# Patient Record
Sex: MALE | Race: OTHER | HISPANIC OR LATINO | Employment: FULL TIME | URBAN - METROPOLITAN AREA
[De-identification: names, ages, dates, MRNs, and addresses within clinical notes are randomized per-mention and may not be internally consistent; named-entity substitution may affect disease eponyms.]

---

## 2020-11-28 ENCOUNTER — HOSPITAL ENCOUNTER (EMERGENCY)
Facility: HOSPITAL | Age: 30
Discharge: HOME | End: 2020-11-28
Attending: EMERGENCY MEDICINE | Admitting: EMERGENCY MEDICINE
Payer: MEDICARE

## 2020-11-28 ENCOUNTER — APPOINTMENT (EMERGENCY)
Dept: CT IMAGING | Facility: HOSPITAL | Age: 30
End: 2020-11-28
Payer: MEDICARE

## 2020-11-28 VITALS
OXYGEN SATURATION: 98 % | RESPIRATION RATE: 20 BRPM | DIASTOLIC BLOOD PRESSURE: 93 MMHG | SYSTOLIC BLOOD PRESSURE: 138 MMHG | TEMPERATURE: 97.4 F | HEART RATE: 88 BPM

## 2020-11-28 DIAGNOSIS — N20.0 KIDNEY STONE: Primary | ICD-10-CM

## 2020-11-28 PROCEDURE — 74176 CT ABD & PELVIS W/O CONTRAST: CPT

## 2020-11-28 PROCEDURE — 96372 THER/PROPH/DIAG INJ SC/IM: CPT

## 2020-11-28 PROCEDURE — 99284 EMERGENCY DEPT VISIT MOD MDM: CPT

## 2020-11-28 PROCEDURE — G1004 CDSM NDSC: HCPCS

## 2020-11-28 PROCEDURE — 99284 EMERGENCY DEPT VISIT MOD MDM: CPT | Performed by: EMERGENCY MEDICINE

## 2020-11-28 RX ORDER — TAMSULOSIN HYDROCHLORIDE 0.4 MG/1
0.4 CAPSULE ORAL
Qty: 10 CAPSULE | Refills: 0 | Status: SHIPPED | OUTPATIENT
Start: 2020-11-28 | End: 2021-02-22 | Stop reason: ALTCHOICE

## 2020-11-28 RX ORDER — KETOROLAC TROMETHAMINE 30 MG/ML
30 INJECTION, SOLUTION INTRAMUSCULAR; INTRAVENOUS ONCE
Status: COMPLETED | OUTPATIENT
Start: 2020-11-28 | End: 2020-11-28

## 2020-11-28 RX ORDER — OXYCODONE HYDROCHLORIDE AND ACETAMINOPHEN 5; 325 MG/1; MG/1
1 TABLET ORAL EVERY 4 HOURS PRN
Qty: 16 TABLET | Refills: 0 | Status: SHIPPED | OUTPATIENT
Start: 2020-11-28 | End: 2020-12-08

## 2020-11-28 RX ADMIN — KETOROLAC TROMETHAMINE 30 MG: 30 INJECTION, SOLUTION INTRAMUSCULAR; INTRAVENOUS at 16:14

## 2021-01-10 ENCOUNTER — HOSPITAL ENCOUNTER (EMERGENCY)
Facility: HOSPITAL | Age: 31
Discharge: HOME | End: 2021-01-10
Attending: EMERGENCY MEDICINE | Admitting: EMERGENCY MEDICINE
Payer: MEDICARE

## 2021-01-10 VITALS
SYSTOLIC BLOOD PRESSURE: 149 MMHG | OXYGEN SATURATION: 100 % | TEMPERATURE: 99.1 F | DIASTOLIC BLOOD PRESSURE: 103 MMHG | HEART RATE: 70 BPM | RESPIRATION RATE: 16 BRPM | WEIGHT: 315 LBS

## 2021-01-10 DIAGNOSIS — J02.9 SORE THROAT: Primary | ICD-10-CM

## 2021-01-10 LAB — S PYO DNA THROAT QL NAA+PROBE: NORMAL

## 2021-01-10 PROCEDURE — 99283 EMERGENCY DEPT VISIT LOW MDM: CPT

## 2021-01-10 PROCEDURE — 99284 EMERGENCY DEPT VISIT MOD MDM: CPT | Performed by: PHYSICIAN ASSISTANT

## 2021-01-10 PROCEDURE — 87651 STREP A DNA AMP PROBE: CPT | Performed by: PHYSICIAN ASSISTANT

## 2021-01-10 PROCEDURE — 87637 SARSCOV2&INF A&B&RSV AMP PRB: CPT | Performed by: PHYSICIAN ASSISTANT

## 2021-01-10 RX ORDER — BENZONATATE 100 MG/1
100 CAPSULE ORAL 3 TIMES DAILY PRN
Qty: 21 CAPSULE | Refills: 0 | Status: SHIPPED | OUTPATIENT
Start: 2021-01-10 | End: 2021-02-22 | Stop reason: ALTCHOICE

## 2021-01-10 NOTE — Clinical Note
Anjana Monroe was seen and treated in our emergency department on 1/10/2021  Other - See Comments        Diagnosis:     Binh    He may return on this date:     Do not return to work until negative test results returned      If you have any questions or concerns, please don't hesitate to call        Anita Yoder PA-C    ______________________________           _______________          _______________  Hospital Representative                              Date                                Time

## 2021-01-10 NOTE — DISCHARGE INSTRUCTIONS
Rule Out COVID/Strep  -Quarantine self until test results return  -Tessalon Pearls for cough  -Tylenol/Ibuprofen alternating for pain/discomfort  -ED will call with test results  -May print test results through [facility] My Chart

## 2021-01-10 NOTE — ED PROVIDER NOTES
History  Chief Complaint   Patient presents with    Sore Throat     Sent home from work d/t fever, sore throat, headache  States in order to go back to work must have a covid test       HPI  28-year-old male with past medical history of hypertension and kidney stones presents to the ED after direct exposure to COVID positive neighbor  Patient states that he attempted to go to work at [employer] on Friday evening, and at the check in gate his temperature was 102°F   He also admitted to associated sore throat and headache x2 days  He states that he had not spiked a fever since Friday evening  He denies any nausea, vomiting, chills, shortness of breath, chest pain, abdominal pain, change in bowel or bladder habits  He admits to positive sick contacts with COVID  He has no recent travel history  Prior to Admission Medications   Prescriptions Last Dose Informant Patient Reported? Taking?   tamsulosin (FLOMAX) 0 4 mg   No Yes   Sig: Take 1 capsule (0 4 mg total) by mouth daily with dinner      Facility-Administered Medications: None       Past Medical History:   Diagnosis Date    Hypertension     Kidney stone        History reviewed  No pertinent surgical history  History reviewed  No pertinent family history  I have reviewed and agree with the history as documented  E-Cigarette/Vaping     E-Cigarette/Vaping Substances     Social History     Tobacco Use    Smoking status: Current Every Day Smoker    Smokeless tobacco: Never Used    Tobacco comment: Hookah daily   Substance Use Topics    Alcohol use: Yes     Comment: socially    Drug use: Never       Review of Systems   Constitutional: Positive for fever  Negative for activity change and chills  HENT: Positive for sore throat  Negative for congestion  Respiratory: Negative for cough, shortness of breath and wheezing  Cardiovascular: Negative for chest pain and palpitations     Gastrointestinal: Negative for abdominal pain, diarrhea, nausea and vomiting  Genitourinary: Negative for difficulty urinating and flank pain  Musculoskeletal: Negative for back pain and neck pain  Skin: Negative for color change and rash  Neurological: Positive for headaches  Negative for weakness and light-headedness  Psychiatric/Behavioral: Negative for agitation and behavioral problems  Physical Exam  Physical Exam  Vitals signs and nursing note reviewed  Constitutional:       Appearance: He is well-developed  HENT:      Head: Normocephalic and atraumatic  Right Ear: External ear normal       Left Ear: External ear normal       Mouth/Throat:      Lips: Pink  Mouth: Mucous membranes are moist       Palate: No mass and lesions  Pharynx: Oropharynx is clear  Posterior oropharyngeal erythema present  No oropharyngeal exudate or uvula swelling  Tonsils: No tonsillar exudate  1+ on the right  1+ on the left  Eyes:      Conjunctiva/sclera: Conjunctivae normal    Neck:      Musculoskeletal: Normal range of motion  Cardiovascular:      Rate and Rhythm: Normal rate and regular rhythm  Heart sounds: Normal heart sounds  Pulmonary:      Effort: Pulmonary effort is normal       Breath sounds: Normal breath sounds  Abdominal:      Palpations: Abdomen is soft  Musculoskeletal: Normal range of motion  Skin:     General: Skin is warm  Findings: No rash  Neurological:      Mental Status: He is alert and oriented to person, place, and time     Psychiatric:         Behavior: Behavior normal          Vital Signs  ED Triage Vitals [01/10/21 1102]   Temperature Pulse Respirations Blood Pressure SpO2   99 1 °F (37 3 °C) 70 16 (!) 149/103 100 %      Temp Source Heart Rate Source Patient Position - Orthostatic VS BP Location FiO2 (%)   Temporal Monitor Sitting Right arm --      Pain Score       6           Vitals:    01/10/21 1102   BP: (!) 149/103   Pulse: 70   Patient Position - Orthostatic VS: Sitting         Visual Acuity      ED Medications  Medications - No data to display    Diagnostic Studies  Results Reviewed     Procedure Component Value Units Date/Time    Strep A PCR [[id number]] Collected: 01/10/21 1125    Lab Status: In process Specimen: Throat Updated: 01/10/21 1128    Novel Coronavirus 2019(COVID-19), Influenza A/B, RSV [facility] [[id number]] Collected: 01/10/21 1125    Lab Status: In process Specimen: Nasopharyngeal Swab Updated: 01/10/21 1128                 No orders to display              Procedures  Procedures         ED Course                                           MDM  Number of Diagnoses or Management Options  Sore throat: minor  Diagnosis management comments: 72-year-old male with past medical history of hypertension and kidney stones presents to the ED after direct exposure to COVID positive neighbor  Patient states that he attempted to go to work at [employer] on Friday evening, and at the check in gate his temperature was 102°F      Rule out COVID/strep/influenza  -strep test  -COVID test  -discharge with quarantine instructions until test results returned       Amount and/or Complexity of Data Reviewed  Clinical lab tests: ordered    Patient Progress  Patient progress: stable      Disposition  Final diagnoses:   Sore throat     Time reflects when diagnosis was documented in both MDM as applicable and the Disposition within this note     Time User Action Codes Description Comment    1/10/2021 11:27 AM Yuri Mcclelland Add [J02 9] Sore throat       ED Disposition     ED Disposition Condition Date/Time Comment    Discharge Stable Sun Daniel 10, 2021 11:27 AM [address] discharge to home/self care              Follow-up Information    None         Patient's Medications   Discharge Prescriptions    BENZONATATE (TESSALON PERLES) 100 MG CAPSULE    Take 1 capsule (100 mg total) by mouth 3 (three) times a day as needed for cough       Start Date: 1/10/2021 End Date: --       Order Dose: 100 mg Quantity: 21 capsule    Refills: 0     No discharge procedures on file      PDMP Review     None          ED Provider  Electronically Signed by           Maureen Rivera PA-C  01/10/21 1224 [place], [place]  01/10/21 5752

## 2021-01-11 LAB
FLUAV RNA NPH QL NAA+PROBE: NOT DETECTED
FLUBV RNA NPH QL NAA+PROBE: NOT DETECTED
RSV RNA NPH QL NAA+PROBE: NOT DETECTED
SARS-COV-2 RNA NPH QL NAA+PROBE: NOT DETECTED

## 2021-01-11 NOTE — RESULT ENCOUNTER NOTE
Discussed negative covid result with patient  Patient verbalized understanding and all questions answered

## 2021-02-22 ENCOUNTER — NURSE TRIAGE (OUTPATIENT)
Dept: OTHER | Facility: OTHER | Age: 31
End: 2021-02-22

## 2021-02-22 ENCOUNTER — HOSPITAL ENCOUNTER (EMERGENCY)
Facility: HOSPITAL | Age: 31
Discharge: HOME | End: 2021-02-22
Attending: EMERGENCY MEDICINE
Payer: MEDICARE

## 2021-02-22 VITALS
TEMPERATURE: 98.5 F | SYSTOLIC BLOOD PRESSURE: 166 MMHG | HEART RATE: 63 BPM | WEIGHT: 315 LBS | DIASTOLIC BLOOD PRESSURE: 87 MMHG | OXYGEN SATURATION: 100 % | RESPIRATION RATE: 18 BRPM

## 2021-02-22 DIAGNOSIS — Z20.822 ENCOUNTER FOR LABORATORY TESTING FOR COVID-19 VIRUS: ICD-10-CM

## 2021-02-22 DIAGNOSIS — B34.9 VIRAL SYNDROME: Primary | ICD-10-CM

## 2021-02-22 LAB — SARS-COV-2 RNA RESP QL NAA+PROBE: NEGATIVE

## 2021-02-22 PROCEDURE — 99282 EMERGENCY DEPT VISIT SF MDM: CPT | Performed by: PHYSICIAN ASSISTANT

## 2021-02-22 PROCEDURE — 99283 EMERGENCY DEPT VISIT LOW MDM: CPT

## 2021-02-22 PROCEDURE — 87635 SARS-COV-2 COVID-19 AMP PRB: CPT | Performed by: PHYSICIAN ASSISTANT

## 2021-02-22 NOTE — TELEPHONE ENCOUNTER
Patient stated that he went to the ER for testing and was told to call out hotline  Will go back for evaluation and is requesting to be tested

## 2021-02-22 NOTE — TELEPHONE ENCOUNTER
1  Were you within 6 feet or less, for up to 15 minutes or more with a person that has a confirmed COVID-19 test? Yes, my niece  2  What was the date of your exposure? Symptoms started on 02/20/2021  3  Are you experiencing any symptoms attributed to the virus?  (Assess for SOB, cough, fever, difficulty breathing) Chest pain is intermittent, 4 out of 10 now but can can go up to 9 or 10, HA, 100 5 / thermal scanner, very slight cough, mild sore throat, runny nose, nasal congestion, diarrhea, nausea,  No sense of taste, back pain and fatigue  4  HIGH RISK: Do you have any history heart or lung c onditions, weakened immune system, diabetes, Asthma, CHF, HIV, COPD, Chemo, renal failure, sickle cell, etc? Denies  5  PREGNANCY: Are you pregnant or did you recently give birth?  N/A

## 2021-02-22 NOTE — Clinical Note
Neal Najera was seen and treated in our emergency department on 2/22/2021  Diagnosis: Viral syndrome, Pending COVID-19 test    Binh    He may return on this date:     Patient must quarantine until informed of COVID-19 test results  If negative, patient must be without symptoms for 3 days before returning to work  If you have any questions or concerns, please don't hesitate to call        Dea Lopez PA-C    ______________________________           _______________          _______________  Hospital Representative                              Date                                Time

## 2021-02-22 NOTE — ED PROVIDER NOTES
HPI: Patient is a 31 y o  male who presents with 2 days of fever, headache, fatigue, myalgias and diarrhea which the patient describes at mild, diarrhea is non-bloody  Patient took tylenol just prior to arrival for headache with resolution of headache  Patient denies any associated vision changes, numbness, tingling, focal weakness, neck pain or stiffness, dizziness, lightheadedness, abdominal pain  The patient has had contact with people with similar symptoms  The patient taken OTC medication with relief of symptoms  Contrary to triage, patient denied chest pain, SOB  No Known Allergies    Past Medical History:   Diagnosis Date    Hypertension     Kidney stone       History reviewed  No pertinent surgical history  Social History     Tobacco Use    Smoking status: Current Every Day Smoker    Smokeless tobacco: Never Used    Tobacco comment: [employer] daily   Substance Use Topics    Alcohol use: Yes     Comment: socially    Drug use: Never       Nursing notes reviewed  Physical Exam:  ED Triage Vitals   Temperature Pulse Respirations Blood Pressure SpO2   02/22/21 1151 02/22/21 1151 02/22/21 1151 02/22/21 1152 02/22/21 1151   98 5 °F (36 9 °C) 63 18 166/87 100 %      Temp Source Heart Rate Source Patient Position - Orthostatic VS BP Location FiO2 (%)   02/22/21 1151 -- 02/22/21 1151 02/22/21 1151 --   Oral  Sitting Right arm       Pain Score       --                  ROS: Positive for fever, mild, intermittent headache, body aches, fatigue  Tthe remainder of a 10 organ system ROS was otherwise unremarkable  General: awake, alert, no acute distress    Patient appears well, no acute distress, non-toxic appearing    Head: normocephalic, atraumatic    Eyes: no scleral icterus  Ears: external ears normal, hearing grossly intact  Nose: external exam grossly normal, positive nasal discharge  Neck: symmetric, No JVD noted, trachea midline  Pulmonary: no respiratory distress, no tachypnea noted  Cardiovascular: appears well perfused  Abdomen: no distention noted  Musculoskeletal: no deformities noted, tone normal  Neuro: grossly non-focal  Psych: mood and affect appropriate    The patient is stable and has a history and physical exam consistent with a viral illness  COVID19 testing has been performed  I considered the patient's other medical conditions as applicable/noted above in my medical decision making  The patient is stable upon discharge  The plan is for supportive care at home  The patient (and any family present) verbalized understanding of the discharge instructions and warnings that would necessitate return to the Emergency Department  Extensive discussion with patient regarding COVID19 testing, precautions, treatment at home, education including home self-quarantine instructions  Provided education should results be positive or negative  Reviewed red flags symptoms and strict return to ED instructions  Patient notes understanding and  agrees to plan  All questions were answered prior to discharge  Medications - No data to display  Final diagnoses:   Viral syndrome   Encounter for laboratory testing for COVID-19 virus     Time reflects when diagnosis was documented in both MDM as applicable and the Disposition within this note     Time User Action Codes Description Comment    2/22/2021 12:24 PM Ben Valle Add [B34 9] Viral syndrome     2/22/2021 12:24 PM Andree Berry Add [Z20 822] Encounter for laboratory testing for COVID-19 virus       ED Disposition     ED Disposition Condition Date/Time Comment    Discharge Stable Mon Feb 22, 2021 12:24 PM [address] discharge to home/self care              Follow-up Information     Follow up With Specialties Details Why Contact Info Additional [address] [facility] Emergency Medicine  If symptoms worsen [place] [phone]  [facility], [place], [facility], [address]  , [place], [place], [address]        There are no discharge medications for this patient  No discharge procedures on file      Electronically Signed by     Deb Grajeda PA-C  02/22/21 1593

## 2021-02-22 NOTE — TELEPHONE ENCOUNTER
Regarding: COVID - Symptomatic - Chest Pain, Fever  ----- Message from Lona Levine sent at 2/22/2021 10:49 AM EST -----  "I have a fever (100 5), chest pain, headaches, and I had exposure to COVID   I need to get tested I was sent home from work "

## 2021-02-22 NOTE — TELEPHONE ENCOUNTER
Reason for Disposition   Chest pain or pressure    Protocols used: CORONAVIRUS (COVID-19) DIAGNOSED OR SUSPECTED-ADULT-AH

## 2022-03-30 ENCOUNTER — OCCMED (OUTPATIENT)
Dept: URGENT CARE | Age: 32
End: 2022-03-30
Payer: OTHER MISCELLANEOUS

## 2022-03-30 ENCOUNTER — APPOINTMENT (OUTPATIENT)
Dept: RADIOLOGY | Age: 32
End: 2022-03-30

## 2022-03-30 DIAGNOSIS — M54.50 PAIN OF LUMBAR SPINE: ICD-10-CM

## 2022-03-30 DIAGNOSIS — S33.5XXA LUMBAR SPRAIN, INITIAL ENCOUNTER: Primary | ICD-10-CM

## 2022-03-30 PROCEDURE — 99283 EMERGENCY DEPT VISIT LOW MDM: CPT

## 2022-03-30 PROCEDURE — 72100 X-RAY EXAM L-S SPINE 2/3 VWS: CPT

## 2022-03-30 PROCEDURE — G0382 LEV 3 HOSP TYPE B ED VISIT: HCPCS

## 2022-04-01 ENCOUNTER — APPOINTMENT (OUTPATIENT)
Dept: URGENT CARE | Age: 32
End: 2022-04-01
Payer: OTHER MISCELLANEOUS

## 2022-04-01 PROCEDURE — 99213 OFFICE O/P EST LOW 20 MIN: CPT | Performed by: PHYSICIAN ASSISTANT

## 2022-04-08 ENCOUNTER — APPOINTMENT (OUTPATIENT)
Dept: URGENT CARE | Age: 32
End: 2022-04-08
Payer: OTHER MISCELLANEOUS

## 2022-04-08 PROCEDURE — 99213 OFFICE O/P EST LOW 20 MIN: CPT | Performed by: PHYSICIAN ASSISTANT

## 2022-04-12 ENCOUNTER — APPOINTMENT (OUTPATIENT)
Dept: URGENT CARE | Age: 32
End: 2022-04-12
Payer: OTHER MISCELLANEOUS

## 2022-04-12 PROCEDURE — 99213 OFFICE O/P EST LOW 20 MIN: CPT | Performed by: PHYSICIAN ASSISTANT

## 2022-04-28 ENCOUNTER — APPOINTMENT (OUTPATIENT)
Dept: URGENT CARE | Age: 32
End: 2022-04-28
Payer: OTHER MISCELLANEOUS

## 2022-04-28 PROCEDURE — 99213 OFFICE O/P EST LOW 20 MIN: CPT | Performed by: FAMILY MEDICINE

## 2025-05-01 ENCOUNTER — APPOINTMENT (OUTPATIENT)
Dept: URGENT CARE | Age: 35
End: 2025-05-01